# Patient Record
Sex: FEMALE | Race: WHITE
[De-identification: names, ages, dates, MRNs, and addresses within clinical notes are randomized per-mention and may not be internally consistent; named-entity substitution may affect disease eponyms.]

---

## 2018-08-30 ENCOUNTER — HOSPITAL ENCOUNTER (OUTPATIENT)
Dept: HOSPITAL 92 - SDC | Age: 63
Discharge: HOME | End: 2018-08-30
Attending: OTOLARYNGOLOGY
Payer: COMMERCIAL

## 2018-08-30 VITALS — BODY MASS INDEX: 32.5 KG/M2

## 2018-08-30 DIAGNOSIS — J34.3: ICD-10-CM

## 2018-08-30 DIAGNOSIS — Z79.899: ICD-10-CM

## 2018-08-30 DIAGNOSIS — J38.01: Primary | ICD-10-CM

## 2018-08-30 DIAGNOSIS — Z79.2: ICD-10-CM

## 2018-08-30 DIAGNOSIS — E78.00: ICD-10-CM

## 2018-08-30 DIAGNOSIS — H91.90: ICD-10-CM

## 2018-08-30 DIAGNOSIS — K21.9: ICD-10-CM

## 2018-08-30 PROCEDURE — 96375 TX/PRO/DX INJ NEW DRUG ADDON: CPT

## 2018-08-30 PROCEDURE — 3E0F8GC INTRODUCTION OF OTHER THERAPEUTIC SUBSTANCE INTO RESPIRATORY TRACT, VIA NATURAL OR ARTIFICIAL OPENING ENDOSCOPIC: ICD-10-PCS | Performed by: OTOLARYNGOLOGY

## 2018-08-30 PROCEDURE — 93005 ELECTROCARDIOGRAM TRACING: CPT

## 2018-08-30 PROCEDURE — 93010 ELECTROCARDIOGRAM REPORT: CPT

## 2018-08-30 PROCEDURE — 85014 HEMATOCRIT: CPT

## 2018-08-30 PROCEDURE — 36415 COLL VENOUS BLD VENIPUNCTURE: CPT

## 2018-08-30 PROCEDURE — 96374 THER/PROPH/DIAG INJ IV PUSH: CPT

## 2018-08-30 NOTE — EKG
Test Reason : PREOP

Blood Pressure : ***/*** mmHG

Vent. Rate : 070 BPM     Atrial Rate : 070 BPM

   P-R Int : 136 ms          QRS Dur : 086 ms

    QT Int : 412 ms       P-R-T Axes : 034 015 003 degrees

   QTc Int : 444 ms

 

Normal sinus rhythm

Normal ECG

 

Confirmed by IAN DAVIES ([id number]) on 8/30/2018 5:10:08 PM

 

Referred By:  DARSHANA           Confirmed By:IAN DAVIES

## 2018-08-31 NOTE — OP
PREOPERATIVE DIAGNOSIS:  Left vocal cord paralysis.

 

POSTOPERATIVE DIAGNOSIS:  Left vocal cord paralysis.

 

PROCEDURES PERFORMED:  Microsuspension laryngoscopy with left vocal fold injection using Prolaryn Plu
s.

 

PROCEDURE IN DETAIL:  After consent was obtained, the patient was identified, brought to the operatin
g room and placed on the operating table in supine position.  General endotracheal anesthesia was obt
ained with a jet ventilating endotracheal tube and larynx was exposed with an operating laryngoscope,
 suspended from the operative table.  Under microscopic visualization, we were able to examine the vo
baylee cord and the left one was found to be paralyzed.  We injected the Prolaryn Plus product in the miranda
lcus lateral to the true vocal cord.  Thus medializing the cord towards midline.  Care was made not t
o over inject and compromise the airway.  The injector was then removed.  Larynx visualized, extra in
jection was removed under microscopic visualization and hemostasis was obtained with topical adrenali
ne.  We then sprayed the larynx with topical lidocaine and awakened the patient, extubated, and taken
 to recovery room where she remained in stable condition prior to discharge home.